# Patient Record
(demographics unavailable — no encounter records)

---

## 2025-03-20 NOTE — HISTORY OF PRESENT ILLNESS
[Patient reported PAP Smear was normal] : Patient reported PAP Smear was normal [N] : Patient does not use contraception [Y] : Positive pregnancy history [Normal Amount/Duration] :  normal amount and duration [Frequency: Q ___ days] : menstrual periods occur approximately every [unfilled] days [Menarche Age: ____] : age at menarche was [unfilled] [Currently Active] : currently active [Men] : men [No] : No [TextBox_4] : 31 -year old  1 para 0 LMP 2025, presents for an annual examination. Review of systems are negative. [PapSmeardate] : 01/2024 [LMPDate] : 03/16/2025 [MensesFreq] : 28 [MensesLength] : 7 [PGxTotal] : 1 [PGHxFullTerm] : 0 [[facility] : [facility] [PGHxABInduced] : 1 [FreeTextEntry1] : 03/16/2025

## 2025-03-20 NOTE — HISTORY OF PRESENT ILLNESS
[Patient reported PAP Smear was normal] : Patient reported PAP Smear was normal [N] : Patient does not use contraception [Y] : Positive pregnancy history [Normal Amount/Duration] :  normal amount and duration [Frequency: Q ___ days] : menstrual periods occur approximately every [unfilled] days [Menarche Age: ____] : age at menarche was [unfilled] [Currently Active] : currently active [Men] : men [No] : No [TextBox_4] : 31 -year old  1 para 0 LMP 2025, presents for an annual examination. Review of systems are negative. [PapSmeardate] : 01/2024 [LMPDate] : 03/16/2025 [MensesFreq] : 28 [MensesLength] : 7 [PGxTotal] : 1 [PGHxFullTerm] : 0 [[facility] [PGHxABInduced] : 1 [FreeTextEntry1] : 03/16/2025

## 2025-03-20 NOTE — PLAN
[FreeTextEntry1] : Wellness exam. Normal physical examination. Recommendations: ophthalmological, dental, and dermatological examinations.   Patient declined contraception. Recommend condom use for the prevention of STD transmission.   Discussed proper nutrition and physical exercise. Reviewed age-appropriate vaccinations.

## 2025-03-20 NOTE — PHYSICAL EXAM
[Chaperone Present] : A chaperone was present in the examining room during all aspects of the physical examination [Oriented x3] : oriented x3 [Examination Of The Breasts] : a normal appearance [No Masses] : no breast masses were palpable [Labia Majora] : normal [Labia Minora] : normal [Normal] : normal [Uterine Adnexae] : normal [FreeTextEntry2] :  Appropriately responsive, alert, and no acute distress. [FreeTextEntry3] :  Thyroid is non-enlarged, nontender. No palpable nodules or goiter. No lymphadenopathy. [FreeTextEntry7] :  Soft. Nondistended. Nontender. No rebound or guarding. There are no palpable masses. [FreeTextEntry1] :  External genitalia are within normal limits with no lesions visualized. [FreeTextEntry4] : Scant amount of blood in the vault consistent with menses. No vaginal discharge or lesions noted within the vaginal vault. [FreeTextEntry5] :  A speculum was inserted without any difficulty. The cervix was normal in appearance. Pap smear obtained. No cervical motion tenderness. [FreeTextEntry6] : Bimanual examination was notable for a normal, nontender uterus. There were no palpable adnexal masses or adnexal tenderness.